# Patient Record
Sex: FEMALE | Race: WHITE
[De-identification: names, ages, dates, MRNs, and addresses within clinical notes are randomized per-mention and may not be internally consistent; named-entity substitution may affect disease eponyms.]

---

## 2020-12-10 ENCOUNTER — HOSPITAL ENCOUNTER (OUTPATIENT)
Dept: HOSPITAL 41 - JD.SDS | Age: 75
Discharge: HOME | End: 2020-12-10
Attending: OPHTHALMOLOGY
Payer: MEDICARE

## 2020-12-10 DIAGNOSIS — Z98.890: ICD-10-CM

## 2020-12-10 DIAGNOSIS — H16.103: ICD-10-CM

## 2020-12-10 DIAGNOSIS — H02.834: ICD-10-CM

## 2020-12-10 DIAGNOSIS — H02.831: ICD-10-CM

## 2020-12-10 DIAGNOSIS — H16.223: ICD-10-CM

## 2020-12-10 DIAGNOSIS — F17.210: ICD-10-CM

## 2020-12-10 DIAGNOSIS — H25.813: Primary | ICD-10-CM

## 2020-12-10 PROCEDURE — C1780 LENS, INTRAOCULAR (NEW TECH): HCPCS

## 2020-12-10 RX ADMIN — PHENYLEPHRINE HYDROCHLORIDE SCH DROP: 25 SOLUTION OPHTHALMIC at 08:30

## 2020-12-10 RX ADMIN — PHENYLEPHRINE HYDROCHLORIDE SCH DROP: 25 SOLUTION OPHTHALMIC at 08:52

## 2020-12-10 RX ADMIN — PHENYLEPHRINE HYDROCHLORIDE SCH ML: 25 SOLUTION OPHTHALMIC at 09:53

## 2020-12-10 RX ADMIN — PHENYLEPHRINE HYDROCHLORIDE SCH DROP: 25 SOLUTION OPHTHALMIC at 08:41

## 2020-12-10 RX ADMIN — PHENYLEPHRINE HYDROCHLORIDE SCH DROP: 25 SOLUTION OPHTHALMIC at 09:20

## 2020-12-10 RX ADMIN — TETRACAINE HYDROCHLORIDE SCH ML: 5 SOLUTION OPHTHALMIC at 10:03

## 2020-12-10 RX ADMIN — POLYMYXIN B SULFATE AND TRIMETHOPRIM SULFATE SCH DROP: 10000; 1 SOLUTION/ DROPS OPHTHALMIC at 09:06

## 2020-12-10 RX ADMIN — TETRACAINE HYDROCHLORIDE SCH DROP: 5 SOLUTION OPHTHALMIC at 09:40

## 2020-12-10 RX ADMIN — POLYMYXIN B SULFATE AND TRIMETHOPRIM SULFATE SCH ML: 10000; 1 SOLUTION/ DROPS OPHTHALMIC at 10:11

## 2020-12-10 RX ADMIN — POLYMYXIN B SULFATE AND TRIMETHOPRIM SULFATE SCH DROP: 10000; 1 SOLUTION/ DROPS OPHTHALMIC at 08:19

## 2020-12-10 NOTE — [id number]
Post Anesthesia Note





- EVALUATION WITHIN 48HRS OF ANESTHETIC


Vital Signs in Normal Range: Yes


Patient Participated in Evaluation: Yes


Respiratory Function Stable: Yes


Airway Patent: Yes


Cardiovascular Function Stable: Yes


Hydration Status Stable: Yes


Pain Control Satisfactory: Yes


Nausea and Vomiting Control Satisfactory: Yes


Mental Status Recovered: Yes


Vital Signs: 


                                Last Vital Signs











Temp  36.8 C   12/10/20 07:55


 


Pulse  69   12/10/20 07:55


 


Resp  16   12/10/20 07:55


 


BP  131/76   12/10/20 07:55


 


Pulse Ox  99   12/10/20 07:55

## 2020-12-10 NOTE — PCM.PREANE
Preanesthetic Assessment





- Procedure


Proposed Procedure: 





Left Cataract with IOL





- Anesthesia/Transfusion/Family Hx


Anesthesia History: Prior Anesthesia Without Reaction


Family History of Anesthesia Reaction: No





- Review of Systems


General: No Symptoms


Pulmonary: Other (Smoker 1/2 ppd for 40 years. Denies Cough/SOB)


Cardiovascular: No Symptoms


Gastrointestinal: No Symptoms


Neurological: Pre-Existing Deficit ([place])


Other: Reports: Sinus Problem





- Physical Assessment


NPO Status Date: 12/09/20


NPO Status Time: 21:00


Vital Signs: 





                                Last Vital Signs











Temp  36.8 C   12/10/20 07:55


 


Pulse  69   12/10/20 07:55


 


Resp  16   12/10/20 07:55


 


BP  131/76   12/10/20 07:55


 


Pulse Ox  99   12/10/20 07:55











Height: 1.55 m


Weight: 63.503 kg


ASA Class: 2


Mental Status: Alert & Oriented x3


Airway Class: Mallampati = 2


Dentition: Reports: Dentures


Thyro-Mental Finger Breadths: 2


Mouth Opening Finger Breadths: 3


ROM/Head Extension: Full


Lungs: Clear to Auscultation, Normal Respiratory Effort


Cardiovascular: Regular Rate, Regular Rhythm





- Allergies


Allergies/Adverse Reactions: 


                                    Allergies











Allergy/AdvReac Type Severity Reaction Status Date / Time


 


No Known Allergies Allergy   Verified 12/09/20 14:13














- Acknowledgements


Anesthesia Type Planned: MAC


Pt an Appropriate Candidate for the Planned Anesthesia: Yes


Alternatives and Risks of Anesthesia Discussed w Pt/Guardian: Yes


Pt/Guardian Understands and Agrees with Anesthesia Plan: Yes





PreAnesthesia Questionnaire





- HOME MEDS


Home Medications: 


                                    Home Meds





. [No Known Home Meds]  12/09/20 [History]











- CURRENT (IN HOUSE) MEDS


Current Meds: 





                               Current Medications





Brimonidine Tartrate (Alphagan 0.2% Ophth Soln)  0 ml EYELF ASDIRECTED [facility]


   Stop: 12/10/20 23:00


   Last Admin: 12/10/20 08:25 Dose:  1 drop


   Documented by: 


Cefuroxime Sodium (Zinacef)  0 mg EYELF ASDIRECTED [facility]


   Stop: 12/10/20 23:00


Lidocaine HCl (Xylocaine-Mpf 1%)  0 ml INJECT ASDIRECTED [facility]


   Stop: 12/10/20 23:00


Phenylephrine HCl (Chaparro-Synephrine 2.5% Ophth Soln)  0 ml EYELF ASDIRECTED [facility]


   Stop: 12/10/20 23:00


Pilocarpine HCl (Pilocar 4% Ophth Soln)  0 ml EYELF ASDIRECTED [facility]


   Stop: 12/10/20 23:00


Polymyxin/Trimethoprim Sulfate (Polytrim Ophth Soln)  0 ml EYELF ASDIRECTED [facility]


   Stop: 12/10/20 23:00


   Last Admin: 12/10/20 08:19 Dose:  1 drop


   Documented by: 


Tetracaine HCl (Tetracaine 0.5% Steri-Unit Sol)  0 ml EYEBOTH ASDIRECTED GEOVANY


   Stop: 12/10/20 23:00


Tropicamide (Mydriacyl 1% Ophth Soln)  0 ml EYELF ASDIRECTED GE[facility]


   Stop: 12/10/20 23:00

## 2021-01-21 ENCOUNTER — HOSPITAL ENCOUNTER (OUTPATIENT)
Dept: HOSPITAL 41 - JD.SDS | Age: 76
Discharge: HOME | End: 2021-01-21
Attending: OPHTHALMOLOGY
Payer: MEDICARE

## 2021-01-21 DIAGNOSIS — Z98.890: ICD-10-CM

## 2021-01-21 DIAGNOSIS — H25.811: Primary | ICD-10-CM

## 2021-01-21 PROCEDURE — C1780 LENS, INTRAOCULAR (NEW TECH): HCPCS

## 2021-01-21 PROCEDURE — 66984 XCAPSL CTRC RMVL W/O ECP: CPT

## 2021-01-21 RX ADMIN — PHENYLEPHRINE HYDROCHLORIDE SCH DROP: 25 SOLUTION OPHTHALMIC at 07:52

## 2021-01-21 RX ADMIN — LIDOCAINE HYDROCHLORIDE SCH ML: 10 INJECTION, SOLUTION EPIDURAL; INFILTRATION; INTRACAUDAL; PERINEURAL at 09:09

## 2021-01-21 RX ADMIN — POLYMYXIN B SULFATE AND TRIMETHOPRIM SULFATE SCH ML: 10000; 1 SOLUTION/ DROPS OPHTHALMIC at 09:10

## 2021-01-21 RX ADMIN — PHENYLEPHRINE HYDROCHLORIDE SCH DROP: 25 SOLUTION OPHTHALMIC at 07:41

## 2021-01-21 RX ADMIN — CEFUROXIME SCH MG: 750 INJECTION, POWDER, FOR SOLUTION INTRAMUSCULAR; INTRAVENOUS at 09:22

## 2021-01-21 RX ADMIN — POLYMYXIN B SULFATE AND TRIMETHOPRIM SULFATE SCH DROP: 10000; 1 SOLUTION/ DROPS OPHTHALMIC at 08:15

## 2021-01-21 RX ADMIN — TETRACAINE HYDROCHLORIDE SCH DROP: 5 SOLUTION OPHTHALMIC at 08:55

## 2021-01-21 RX ADMIN — PHENYLEPHRINE HYDROCHLORIDE SCH DROP: 25 SOLUTION OPHTHALMIC at 08:35

## 2021-01-21 RX ADMIN — CEFUROXIME SCH MG: 750 INJECTION, POWDER, FOR SOLUTION INTRAMUSCULAR; INTRAVENOUS at 09:09

## 2021-01-21 RX ADMIN — PILOCARPINE HYDROCHLORIDE SCH ML: 40 SOLUTION/ DROPS OPHTHALMIC at 09:23

## 2021-01-21 RX ADMIN — PILOCARPINE HYDROCHLORIDE SCH ML: 40 SOLUTION/ DROPS OPHTHALMIC at 09:10

## 2021-01-21 RX ADMIN — LIDOCAINE HYDROCHLORIDE SCH ML: 10 INJECTION, SOLUTION EPIDURAL; INFILTRATION; INTRACAUDAL; PERINEURAL at 09:08

## 2021-01-21 RX ADMIN — POLYMYXIN B SULFATE AND TRIMETHOPRIM SULFATE SCH DROP: 10000; 1 SOLUTION/ DROPS OPHTHALMIC at 07:32

## 2021-01-21 RX ADMIN — TETRACAINE HYDROCHLORIDE SCH ML: 5 SOLUTION OPHTHALMIC at 09:08

## 2021-01-21 RX ADMIN — PHENYLEPHRINE HYDROCHLORIDE SCH ML: 25 SOLUTION OPHTHALMIC at 09:08

## 2021-01-21 RX ADMIN — POLYMYXIN B SULFATE AND TRIMETHOPRIM SULFATE SCH ML: 10000; 1 SOLUTION/ DROPS OPHTHALMIC at 09:23

## 2021-01-21 RX ADMIN — PHENYLEPHRINE HYDROCHLORIDE SCH DROP: 25 SOLUTION OPHTHALMIC at 08:05

## 2021-01-21 RX ADMIN — PHENYLEPHRINE HYDROCHLORIDE SCH ML: 25 SOLUTION OPHTHALMIC at 09:00

## 2021-01-21 NOTE — PCM.PREANE
Preanesthetic Assessment





- Anesthesia/Transfusion/Family Hx


Anesthesia History: Prior Anesthesia Without Reaction


Family History of Anesthesia Reaction: No


Transfusion History: No Prior Transfusion(s)





- Review of Systems


General: No Symptoms


Pulmonary: Other (smoker, smoked this am)


Cardiovascular: No Symptoms


Gastrointestinal: No Symptoms


Neurological: No Symptoms


Other: Reports: None





- Physical Assessment


NPO Status Date: 01/20/21


NPO Status Time: 18:00


ASA Class: 2


Mental Status: Alert & Oriented x3


Airway Class: Mallampati = 2


Dentition: Reports: Normal Dentition, Dentures


Thyro-Mental Finger Breadths: 3


Mouth Opening Finger Breadths: 3


ROM/Head Extension: Full


Lungs: Clear to Auscultation, Normal Respiratory Effort, Wheezing


Cardiovascular: Regular Rate, Regular Rhythm





- Allergies


Allergies/Adverse Reactions: 


                                    Allergies











Allergy/AdvReac Type Severity Reaction Status Date / Time


 


No Known Allergies Allergy   Verified 01/20/21 10:15














- Blood


Blood Available: No


Product(s) Available: None





- Anesthesia Plan


Pre-Op Medication Ordered: None





- Acknowledgements


Anesthesia Type Planned: MAC


Pt an Appropriate Candidate for the Planned Anesthesia: Yes


Alternatives and Risks of Anesthesia Discussed w Pt/Guardian: Yes


Pt/Guardian Understands and Agrees with Anesthesia Plan: Yes





PreAnesthesia Questionnaire





- HOME MEDS


Home Medications: 


                                    Home Meds





. [No Known Home Meds]  12/09/20 [History]











- CURRENT (IN HOUSE) MEDS


Current Meds: 





                               Current Medications





Brimonidine Tartrate (Alphagan 0.2% Ophth Soln)  0 ml EYERT ASDIRECTED GE[facility]


   Stop: 01/21/21 18:00


Cefuroxime Sodium (Zinacef)  0 mg EYERT ASDIRECTED [facility]


   Stop: 01/21/21 18:00


Lidocaine HCl (Xylocaine-Mpf 1%)  0 ml INJECT ASDIRECTED GEOVANY


   Stop: 01/21/21 18:00


Phenylephrine HCl (Chaparro-Synephrine 2.5% Ophth Soln)  0 ml EYERT ASDIRECTED GE[facility]


   Stop: 01/21/21 18:00


Pilocarpine HCl (Pilocar 4% Ophth Soln)  0 ml EYERT ASDIRECTED GE[facility]


   Stop: 01/21/21 18:00


Polymyxin/Trimethoprim Sulfate (Polytrim Ophth Soln)  0 ml EYERT ASDIRECTED [facility]


   Stop: 01/21/21 18:00


   Last Admin: 01/21/21 07:32 Dose:  1 drop


   Documented by: 


Tetracaine HCl (Tetracaine 0.5% Steri-Unit Sol)  0 ml EYEBOTH ASDIRECTED GEOVANY


   Stop: 01/21/21 18:00


Tropicamide (Mydriacyl 1% Ophth Soln)  0 ml EYERT ASDIRECTED [facility]


   Stop: 01/21/21 18:00





Discontinued Medications





Phenylephrine HCl (Chaparro-Synephrine 2.5% Ophth Soln)  0 ml EYERT ASDIRECTED GE[facility]


   Stop: 01/21/21 18:00